# Patient Record
Sex: FEMALE | Race: WHITE | NOT HISPANIC OR LATINO | URBAN - METROPOLITAN AREA
[De-identification: names, ages, dates, MRNs, and addresses within clinical notes are randomized per-mention and may not be internally consistent; named-entity substitution may affect disease eponyms.]

---

## 2019-10-21 ENCOUNTER — OFFICE VISIT - RIVER FALLS (OUTPATIENT)
Dept: FAMILY MEDICINE | Facility: CLINIC | Age: 19
End: 2019-10-21

## 2019-10-21 ENCOUNTER — COMMUNICATION - RIVER FALLS (OUTPATIENT)
Dept: FAMILY MEDICINE | Facility: CLINIC | Age: 19
End: 2019-10-21

## 2019-10-21 LAB
ALBUMIN UR-MCNC: NEGATIVE G/DL
BACTERIA #/AREA URNS HPF: NORMAL /[HPF]
BILIRUB UR QL STRIP: NEGATIVE
EPITHELIAL CELLS UR: NORMAL
GLUCOSE UR STRIP-MCNC: NEGATIVE MG/DL
HGB UR QL STRIP: ABNORMAL
KETONES UR STRIP-MCNC: NEGATIVE MG/DL
LEUKOCYTE ESTERASE UR QL STRIP: ABNORMAL
NITRATE UR QL: POSITIVE
PH UR STRIP: 5.5 [PH] (ref 5–8)
RBC #/AREA URNS AUTO: NORMAL /[HPF]
SP GR UR STRIP: 1.01 (ref 1–1.03)
WBC #/AREA URNS AUTO: NORMAL /[HPF]

## 2019-10-21 ASSESSMENT — MIFFLIN-ST. JEOR: SCORE: 1373.24

## 2020-05-22 ENCOUNTER — OFFICE VISIT - RIVER FALLS (OUTPATIENT)
Dept: FAMILY MEDICINE | Facility: CLINIC | Age: 20
End: 2020-05-22

## 2020-05-22 LAB
ALBUMIN UR-MCNC: NEGATIVE G/DL
BACTERIA #/AREA URNS HPF: NORMAL /[HPF]
BILIRUB UR QL STRIP: NEGATIVE
EPITHELIAL CELLS UR: NORMAL
GLUCOSE UR STRIP-MCNC: NEGATIVE MG/DL
HGB UR QL STRIP: NEGATIVE
KETONES UR STRIP-MCNC: NEGATIVE MG/DL
LEUKOCYTE ESTERASE UR QL STRIP: ABNORMAL
NITRATE UR QL: NEGATIVE
PH UR STRIP: 7 [PH] (ref 5–8)
RBC #/AREA URNS AUTO: NORMAL /[HPF]
SP GR UR STRIP: 1.01 (ref 1–1.03)
WBC #/AREA URNS AUTO: NORMAL /[HPF]
WBC CLUMPS #/AREA URNS HPF: PRESENT /[HPF]

## 2020-05-23 LAB — TRICHOMONAS VAGINALIS (HISTORICAL): NOT DETECTED

## 2020-05-25 LAB — BACTERIA SPEC CULT: ABNORMAL

## 2020-05-26 ENCOUNTER — COMMUNICATION - RIVER FALLS (OUTPATIENT)
Dept: FAMILY MEDICINE | Facility: CLINIC | Age: 20
End: 2020-05-26

## 2020-06-01 ENCOUNTER — COMMUNICATION - RIVER FALLS (OUTPATIENT)
Dept: FAMILY MEDICINE | Facility: CLINIC | Age: 20
End: 2020-06-01

## 2020-08-19 ENCOUNTER — OFFICE VISIT - RIVER FALLS (OUTPATIENT)
Dept: FAMILY MEDICINE | Facility: CLINIC | Age: 20
End: 2020-08-19

## 2020-08-19 LAB
ALBUMIN UR-MCNC: ABNORMAL G/DL
BACTERIA #/AREA URNS HPF: NORMAL /[HPF]
BILIRUB UR QL STRIP: NEGATIVE
EPITHELIAL CELLS UR: NORMAL
GLUCOSE UR STRIP-MCNC: NEGATIVE MG/DL
HGB UR QL STRIP: ABNORMAL
KETONES UR STRIP-MCNC: NEGATIVE MG/DL
LEUKOCYTE ESTERASE UR QL STRIP: ABNORMAL
NITRATE UR QL: NEGATIVE
PH UR STRIP: 7 [PH] (ref 5–8)
RBC #/AREA URNS AUTO: NORMAL /[HPF]
SP GR UR STRIP: 1.01 (ref 1–1.03)
WBC #/AREA URNS AUTO: NORMAL /[HPF]

## 2020-08-21 LAB — BACTERIA SPEC CULT: NORMAL

## 2022-02-12 VITALS
DIASTOLIC BLOOD PRESSURE: 62 MMHG | SYSTOLIC BLOOD PRESSURE: 112 MMHG | HEIGHT: 65 IN | BODY MASS INDEX: 22.49 KG/M2 | WEIGHT: 135 LBS | HEART RATE: 84 BPM | TEMPERATURE: 97 F

## 2022-02-12 VITALS
SYSTOLIC BLOOD PRESSURE: 112 MMHG | BODY MASS INDEX: 22.8 KG/M2 | DIASTOLIC BLOOD PRESSURE: 78 MMHG | TEMPERATURE: 98.5 F | WEIGHT: 137 LBS | HEART RATE: 72 BPM

## 2022-02-16 NOTE — NURSING NOTE
Comprehensive Intake Entered On:  10/21/2019 3:58 PM CDT    Performed On:  10/21/2019 3:51 PM CDT by Alka Ruvalcaba LPN               Summary   Chief Complaint :   dysuria that started this morning. history of UTI's   Last Menstrual Period :   10/16/2019 CDT   Weight Measured :   135 lb(Converted to: 135 lb 0 oz, 61.23 kg)    Height Measured :   65 in(Converted to: 5 ft 5 in, 165.10 cm)    Body Mass Index :   22.46 kg/m2   Body Surface Area :   1.67 m2   Systolic Blood Pressure :   112 mmHg   Diastolic Blood Pressure :   62 mmHg   Mean Arterial Pressure :   79 mmHg   Peripheral Pulse Rate :   84 bpm   BP Site :   Right arm   Pulse Site :   Radial artery   BP Method :   Manual   HR Method :   Manual   Temperature Tympanic :   97.0 DegF(Converted to: 36.1 DegC)  (LOW)    Alka Ruvalcaba LPN - 10/21/2019 3:51 PM CDT   Health Status   Allergies Verified? :   Yes   Medication History Verified? :   Yes   Pre-Visit Planning Status :   N/A   Tobacco Use? :   Never smoker   Alka Ruvalcaba LPN - 10/21/2019 3:51 PM CDT   Consents   Consent for Immunization Exchange :   Consent Granted   Consent for Immunizations to Providers :   Consent Granted   Alka Ruvalcaba LPN - 10/21/2019 3:51 PM CDT   Meds / Allergies   (As Of: 10/21/2019 3:58:31 PM CDT)   Allergies (Active)   Omnicef  Estimated Onset Date:   Unspecified ; Created By:   Alka Ruvalcaba LPN; Reaction Status:   Active ; Category:   Drug ; Substance:   Omnicef ; Type:   Allergy ; Severity:   Mild ; Updated By:   Alka Ruvalcaba LPN; Reviewed Date:   10/21/2019 3:57 PM CDT      penicillin  Estimated Onset Date:   Unspecified ; Created By:   Alka Ruvalcaba LPN; Reaction Status:   Active ; Category:   Drug ; Substance:   penicillin ; Type:   Allergy ; Updated By:   Alka Ruvalcaba LPN; Reviewed Date:   10/21/2019 3:57 PM CDT        Medication List   (As Of: 10/21/2019 3:58:31 PM CDT)   Home Meds    Miscellaneous Prescription  :   Miscellaneous  Prescription ; Status:   Documented ; Ordered As Mnemonic:   Birth Control ; Simple Display Line:   0 Refill(s) ; Catalog Code:   Miscellaneous Prescription ; Order Dt/Tm:   10/21/2019 3:56:41 PM CDT

## 2022-02-16 NOTE — PROGRESS NOTES
Patient:   CHLOE REIS            MRN: [id number]            FIN: [id number]               Age:   20 years     Sex:  Female     :  2000   Associated Diagnoses:   Urinary tract infection, site not specified   Author:   Mamadou Guevara MD      Visit Information      Date of Service: 2020 09:28 am  Performing Location: [facility]  Encounter#: [id number]      Primary Care Provider (PCP):  NONE ,       Referring Provider:  Mamadou Guevara MD    NPI# [id number]      Chief Complaint   2020 9:29 AM CDT    Concerns with dysuria and polyuria x 3 days. No concerns with STD testing.      Interval History   The patient is in today for evaluation of urinary tract infection symptoms.  She has had a couple of urinary tract infections in the past at about six month intervals and wonders what she might do to prevent them in the future.  She has been symptomatic for a day or two presently.  She has not had fever, back pain, or systemic symptoms.  She mainly just has dysuria.        Review of Systems   Review  of systems is negative except as documented under interval history.      Health Status   Allergies:    Allergic Reactions (Selected)  Mild  Omnicef (No reactions were documented)  Severity Not Documented  Penicillin (No reactions were documented)   Medications:  (Selected)   Prescriptions  Prescribed  Bactrim  mg-160 mg oral tablet: 1 tab(s), Oral, bid, x 7 day(s), # 14 tab(s), 0 Refill(s), Type: Acute, Pharmacy: [id number], 1 tab(s) Oral bid,x7 day(s), 65, in, 10/21/19 15:51:00 CDT, Height Measured, 137, lb, 20 9:29:00 CDT, Weight Measured  Documented Medications  Documented  Birth Control: Birth Control, 0 Refill(s), Type: Maintenance   Problem list:    No problem items selected or recorded.      Histories   Past Medical History:    No active or resolved past medical history items have been selected or recorded.   Family History:    Breast cancer  Grandmother  (M)     Procedure history:    No active procedure history items have been selected or recorded.   Social History:        Alcohol Assessment            Current, Beer (12 oz), Wine (5 oz), Liquor (Hard) (1.5 oz), 1-2 times per month, 4 drinks/episode average.               Ready to change: No.      Tobacco Assessment            Never (less than 100 in lifetime)      Substance Abuse Assessment            Never      Employment and Education Assessment            Student, Highest education level: Some college.      Home and Environment Assessment            Marital status: Single.  Living situation: Home/Independent.  Injuries/Abuse/Neglect in household: No.  Feels               unsafe at home: No.  Family/Friends available for support: Yes.  Risks in environment: Stairs.      Nutrition and Health Assessment            Type of diet: Regular.  Wants to lose weight: No.  Sleeping concerns: Yes.  Feels highly stressed: Yes.      Exercise and Physical Activity Assessment            Exercise frequency: 3-4 times/week.  Exercise type: Walking, Weight lifting, Dancing.      Sexual Assessment            Sexually active: Yes.  Identifies as female, Sexual orientation: Bisexual.  History of STD: No.               Contraceptive Use Details: Birth control pill.  History of sexual abuse: No.        Physical Examination   Vital Signs   8/19/2020 9:29 AM CDT Temperature Tympanic 98.5 DegF    Peripheral Pulse Rate 72 bpm    Pulse Site Radial artery    HR Method Manual    Systolic Blood Pressure 112 mmHg    Diastolic Blood Pressure 78 mmHg    Mean Arterial Pressure 89 mmHg    BP Site Right arm    BP Method Manual      General:  UA finds many bacteria with RBCs and WBCs..       Impression and Plan   Diagnosis     Urinary tract infection, site not specified ([id number] N39.0).     Plan:  Culture is pending.  She will be treated for now with Bactrim DS.  We discussed voiding following intercourse and I did propose that we could do test of  cure but since the episodes are fairly infrequent that probably would not really be of value at this point.  .    Patient Instructions:       Counseled: Patient, Regarding diagnosis, Regarding treatment, Regarding medications, Verbalized understanding.

## 2022-02-16 NOTE — TELEPHONE ENCOUNTER
---------------------  From: Kaleb/Maite BORGES ([place] Message [place] ([id number]))   To: [facility] Message Pool ([id number]_[facility]);     Sent: 5/22/2020 9:22:45 AM CDT    Called pt and LM asking her to call back and switch her appointment to a telephone visit with [facility] as she is awaiting a COVID test and was in contact with a positive person.

## 2022-02-16 NOTE — NURSING NOTE
CAGE Assessment Entered On:  10/23/2019 9:23 AM CDT    Performed On:  10/21/2019 9:23 AM CDT by Abigail Mitchell               Assessment   Have you ever felt you should cut down on your drinking :   No   Have people annoyed you by criticizing your drinking :   No   Have you ever felt bad or guilty about your drinking :   No   Have you ever taken a drink first thing in the morning to steady your nerves or get rid of a hangover (Eye-opener) :   No   CAGE Score :   0    Abigail Mitchell - 10/23/2019 9:23 AM CDT

## 2022-02-16 NOTE — NURSING NOTE
Comprehensive Intake Entered On:  8/19/2020 9:33 AM CDT    Performed On:  8/19/2020 9:29 AM CDT by Dalia Bear CMA               Summary   Chief Complaint :   Concerns with dysuria and polyuria x 3 days. No concerns with STD testing.    Last Menstrual Period :   8/16/2020 CDT   Weight Measured :   137 lb(Converted to: 137 lb 0 oz, 62.14 kg)    Systolic Blood Pressure :   112 mmHg   Diastolic Blood Pressure :   78 mmHg   Mean Arterial Pressure :   89 mmHg   Peripheral Pulse Rate :   72 bpm   BP Site :   Right arm   Pulse Site :   Radial artery   BP Method :   Manual   HR Method :   Manual   Temperature Tympanic :   98.5 DegF(Converted to: 36.9 DegC)    Dalia Bear CMA - 8/19/2020 9:29 AM CDT   Health Status   Allergies Verified? :   Yes   Medication History Verified? :   Yes   Pre-Visit Planning Status :   Completed   Tobacco Use? :   Never smoker   Dalia Bear CMA - 8/19/2020 9:29 AM CDT   Meds / Allergies   (As Of: 8/19/2020 9:33:52 AM CDT)   Allergies (Active)   Omnicef  Estimated Onset Date:   Unspecified ; Created By:   Alka Ruvalcaba LPN; Reaction Status:   Active ; Category:   Drug ; Substance:   Omnicef ; Type:   Allergy ; Severity:   Mild ; Updated By:   Alka Ruvalcaba LPN; Reviewed Date:   10/21/2019 3:57 PM CDT      penicillin  Estimated Onset Date:   Unspecified ; Created By:   Alka Ruvalcaba LPN; Reaction Status:   Active ; Category:   Drug ; Substance:   penicillin ; Type:   Allergy ; Updated By:   Alka Ruvalcaba LPN; Reviewed Date:   10/21/2019 3:57 PM CDT        Medication List   (As Of: 8/19/2020 9:33:52 AM CDT)   Home Meds    Miscellaneous Prescription  :   Miscellaneous Prescription ; Status:   Documented ; Ordered As Mnemonic:   Birth Control ; Simple Display Line:   0 Refill(s) ; Catalog Code:   Miscellaneous Prescription ; Order Dt/Tm:   10/21/2019 3:56:41 PM CDT            ID Risk Screen   Recent Travel History :   No recent travel   Family Member Travel History :    No recent travel   Other Exposure to Infectious Disease :   Unknown   Dalia Bear CMA - 8/19/2020 9:29 AM CDT

## 2022-02-16 NOTE — PROGRESS NOTES
"Chief Complaint    dysuria that started this morning. history of UTI's  History of Present Illness      Patient here for pain with urination, at end of urine stream. Does have a history of UTI with in the past 4 months, does not have a long term history.   Does not hold urine for hours. No back pain. No recent fevers. No change in menstrual cycle. No change in sexual activities.  Review of Systems      \"See HPI.  All other review of systems negative.\"  Physical Exam   Vitals & Measurements    T: 97.0   F (Tympanic)  HR: 84(Peripheral)  BP: 112/62     HT: 65 in  WT: 135 lb  BMI: 22.46             General:  Alert and oriented, No acute distress.             Eye: Normal conjunctiva.             HENT:  Oral mucosa is moist.             Neck:  Supple.             Respiratory:  Respirations are non-labored.             Cardiovascular:  Normal rate, Regular rhythm, No edema.             Gastrointestinal:  Soft, non tender, Non-distended.             Genitourinary:  No flank pain             Musculoskeletal:  Normal gait.             Integumentary:  Warm, No rash.             Psychiatric:  Cooperative, Appropriate mood & affect, Normal judgment.      UA positive for infection  Assessment/Plan       1. UTI (urinary tract infection) (N39.0)         Positive UA for infection. Start Macrobid 100mg twice daily for 7 days.        Prevent urinary tract infections by drinking adequate water (6-8 glasses per day), void frequently (do not 'hold' your urine) and if you are sexually active, always urinate before and after intercourse.  You can drink cranberry juice or take a cranberry supplement to prevent bladder infections also.  If you are prescribed a medicine for pain with urination called Pyridium (azo or uristat), know that this medicine can turn your urine orange and also turn your contacts orange if you wear contacts.       Orders:         nitrofurantoin, = 1 cap(s) ( 100 mg ), Oral, bid, x 7 day(s), # 14 cap(s), 0 Refill(s), " Type: Acute, Pharmacy: [facility] [place], 1 cap(s) Oral bid,x7 day(s), (Ordered)         POC URINALYSIS, UA* ([facility]), Specimen Type: Urine, Collection Date: 10/21/19 16:04:00 CDT      I, Alka Ruvalcaba LPN, acted solely as a scribe for, and in the presence of Dr. Lawrence Chase who performed the services.  Patient Information     Name:CHLOE REIS      Address:      [address] [id number]     Sex:Female     YOB: 2000     [phone]     Emergency Contact:URI MACKENZIE     MRN:[id number]     FIN:[id number]     Location:[facility]     Date of Service:10/21/2019      Primary Care Physician:       NONE ,       Attending Physician:       Lawrence Chase MD, [phone]  Problem List/Past Medical History    Ongoing     No qualifying data    Historical     No qualifying data  Medications    Birth Control    nitrofurantoin macrocrystals-monohydrate 100 mg oral capsule, 100 mg= 1 cap(s), Oral, bid  Allergies    Omnicef    penicillin  Social History    Smoking Status - 10/21/2019     Never smoker  Lab Results       Lab Results (Last 4 results within 90 days)        UA pH: 5.5 [5  - 8] (10/21/19 16:09:00)       UA Specific Gravity: 1.015 [1.001  - 1.035] (10/21/19 16:09:00)       UA Glucose: NEGATIVE [NEGATIVE  - NEGATIVE] (10/21/19 16:09:00)       UA Bilirubin: NEGATIVE [NEGATIVE  - NEGATIVE] (10/21/19 16:09:00)       UA Ketones: NEGATIVE [NEGATIVE  - NEGATIVE] (10/21/19 16:09:00)       Urine Occult Blood: 2+ Abnormal [NEGATIVE  - NEGATIVE] (10/21/19 16:09:00)       UA Protein: NEGATIVE [NEGATIVE  - NEGATIVE] (10/21/19 16:09:00)       UA Nitrite: POSITIVE Abnormal [NEGATIVE  - NEGATIVE] (10/21/19 16:09:00)       UA Leukocyte Esterase: 2+ Abnormal [NEGATIVE  - NEGATIVE] (10/21/19 16:09:00)       UA Epithelial Cells: Moderate [None  - Few] (10/21/19 16:20:00)       UA WBC:  [None  - 5] (10/21/19  16:20:00)       UA RBC: 3-5 [None  - 2] (10/21/19 16:20:00)       UA Bacteria: Many [None  - Few] (10/21/19 16:20:00)

## 2022-02-16 NOTE — LETTER
(Inserted Image. Unable to display)   June 01, 2020      CHLOE MACKENZIE      [address]  [address] [place], [place] [id number]        Dear CHLOE,    Thank you for selecting [facility] for your healthcare needs.  Below you will find the results of the recent tests done at our clinic.      Here are your test results, you were also contacted on the phone in my absence. Your recent urine culture results show positive growth of a bacteria that is responsive to the antibiotic which you were prescribed.  Please finish the full course of the antibiotic.  If your symptoms are not then resolved, or if they are worsening,  please let me know. Thank you.      Result Name Current Result Reference Range   Chlam/N. gonorrhea Comments  See comment 5/22/2020    Chlamydia RNA  NOT DETECTED 5/22/2020 NOT DETECTED -    Neisseria gonorrhoeae RNA  NOT DETECTED 5/22/2020 NOT DETECTED -    Trichomonas  NOT DETECTED 5/22/2020 NOT DETECTED -    Culture Urine ((A)) See comment 5/22/2020        Please contact me or my assistant at [phone] if you have any questions or concerns.     Sincerely,        SANFORD Huddleston-NP  Family Nurse Practitioner      What do your labs mean?  Below is a glossary of commonly ordered labs:  LDL   Bad Cholesterol   HDL   Good Cholesterol  AST/ALT   Liver Function   Cr/Creatinine   Kidney Function  Microalbumin   Kidney Function  BUN   Kidney Function  PSA   Prostate    TSH   Thyroid Hormone  HgbA1c   Diabetes Test   Hgb (Hemoglobin)   Red Blood Cells  WBC   White Blood Cell Count

## 2022-02-16 NOTE — NURSING NOTE
Comprehensive Intake Entered On:  5/22/2020 9:37 AM CDT    Performed On:  5/22/2020 9:36 AM CDT by Manju Gomez CMA   Chief Complaint :   c/o dysuria and urinary frequency x4 days. Verbal consent given for telephone visit.    Manju Gomez CMA - 5/22/2020 9:36 AM CDT   Health Status   Allergies Verified? :   Yes   Medication History Verified? :   Yes   Pre-Visit Planning Status :   N/A   Tobacco Use? :   Never smoker   Manju Gomez CMA - 5/22/2020 9:36 AM CDT   Consents   Consent for Immunization Exchange :   Consent Granted   Consent for Immunizations to Providers :   Consent Granted   Manju Gomez CMA - 5/22/2020 9:36 AM CDT   Meds / Allergies   (As Of: 5/22/2020 9:37:04 AM CDT)   Allergies (Active)   Omnicef  Estimated Onset Date:   Unspecified ; Created By:   Alka Ruvalcaba LPN; Reaction Status:   Active ; Category:   Drug ; Substance:   Omnicef ; Type:   Allergy ; Severity:   Mild ; Updated By:   Alka Ruvalcaba LPN; Reviewed Date:   10/21/2019 3:57 PM CDT      penicillin  Estimated Onset Date:   Unspecified ; Created By:   Alka Ruvalcaba LPN; Reaction Status:   Active ; Category:   Drug ; Substance:   penicillin ; Type:   Allergy ; Updated By:   Alka Ruvalcaba LPN; Reviewed Date:   10/21/2019 3:57 PM CDT        Medication List   (As Of: 5/22/2020 9:37:04 AM CDT)   Home Meds    Miscellaneous Prescription  :   Miscellaneous Prescription ; Status:   Documented ; Ordered As Mnemonic:   Birth Control ; Simple Display Line:   0 Refill(s) ; Catalog Code:   Miscellaneous Prescription ; Order Dt/Tm:   10/21/2019 3:56:41 PM CDT            ID Risk Screen   Recent Travel History :   No recent travel   Family Member Travel History :   No recent travel   Other Exposure to Infectious Disease :   Healthcare exposure to COVID-19 within the last 14 days   Manju Gomez CMA - 5/22/2020 9:36 AM CDT

## 2022-02-16 NOTE — LETTER
(Inserted Image. Unable to display)   August 25, 2020      CHLOE MACKENZIE  [address]  [place], [place] [id number]        Dear CHLOE,      Thank you for selecting [facility] for your healthcare needs.     Urine culture is negative. Please let us know if your symptoms are not improving.       Result Name Current Result   Urine Culture  See comment 8/19/2020       Please contact me or my assistant at [phone] if you have any questions or concerns.     Sincerely,

## 2022-02-16 NOTE — PROGRESS NOTES
Patient:   ANIKA REIS            MRN: [id number]            FIN: [id number]               Age:   20 years     Sex:  Female     :  2000   Associated Diagnoses:   Dysuria   Author:   Janis Rios      Visit Information      Date of Service: 2020 09:36 am  Performing Location: [facility]  Encounter#: [id number]      Primary Care Provider (PCP):  NONE ,       Referring Provider:  María Elena Miller MD, NPI# [id number]   Visit type:  Telephone Encounter.    Source of history:  Patient.    Location of patient:  _home  Call Start Time:   _1010  Call End Time:    _1023      Chief Complaint   2020 9:36 AM CDT    c/o dysuria and urinary frequency x4 days. Verbal consent given for telephone visit.   _      History of Present Illness   Today's visit was conducted via telephone due to the COVID-19 pandemic. Patient's consent to telephone visit was obtained and documented.      Reason for visit:  _Anika has a COVID19 test result currently pending. so she will be doing a telephone visit today  Onset of pain and frequency with urination 4 days ago.  No back pain or fever. No vaginal DC or sores.    She has hx of two other UTIs elsewhere in Nov and , felt like both resolved, was treated with Macrobid and DID have urine collected at those visits.  She is sexually active, had a chlamydia test in Dec, has had a new partner since then, last IC about 1 month ago  She would like to do STI testing as well  no hx of kidney problems      Impression and Plan   Diagnosis     Dysuria (KPR08-AU R30.0).     Plan:  will treat as UTi, push fluids and await STI testing.    Orders     Orders (Selected)   Outpatient Orders  Ordered (In Transit)  Chlamydia/Neisseria gonorrhoeae RNA, TMA* ([facility]): Specimen Type: Urine, Collection Date: 20 10:23:00 CDT  Culture, Urine, Routine* ([facility]): Specimen Type: Urine (Clean Catch), Collection Date: 20 10:23:00 CDT  S[place]wab(R) Trichomonas  vaginalis RNA, Ql, TMA* ([facility]): Specimen Type: Swab, Collection Date: 05/22/20 10:22:00 CDT  Prescriptions  Prescribed  Bactrim  mg-160 mg oral tablet: 1 tab(s), Oral, bid, x 3 day(s), # 6 tab(s), 0 Refill(s), Type: Acute, Pharmacy: [facility] #[id number], 1 tab(s) Oral bid,x3 day(s), 65, in, 10/21/19 15:51:00 CDT, Height Measured, 135, lb, 10/21/19 15:51:00 CDT, Weight Measured.        Health Status   Allergies:    Allergic Reactions (Selected)  Mild  Omnicef (No reactions were documented)  Severity Not Documented  Penicillin (No reactions were documented)   Medications:  (Selected)   Documented Medications  Documented  Birth Control: Birth Control, 0 Refill(s), Type: Maintenance,    Medications          *denotes recorded medication          *Birth Control: 0 Refill(s).       Problem list:    No problem items selected or recorded.      Histories   Past Medical History:    No active or resolved past medical history items have been selected or recorded.   Family History:    Breast cancer  Grandmother (M)     Procedure history:    No active procedure history items have been selected or recorded.   Social History:        Alcohol Assessment            Current, Beer (12 oz), Wine (5 oz), Liquor (Hard) (1.5 oz), 1-2 times per month, 4 drinks/episode average.               Ready to change: No.      Tobacco Assessment            Never (less than 100 in lifetime)      Substance Abuse Assessment            Never      Employment and Education Assessment            Student, Highest education level: Some college.      Home and Environment Assessment            Marital status: Single.  Living situation: Home/Independent.  Injuries/Abuse/Neglect in household: No.  Feels               unsafe at home: No.  Family/Friends available for support: Yes.  Risks in environment: Stairs.      Nutrition and Health Assessment            Type of diet: Regular.  Wants to lose weight: No.  Sleeping concerns: Yes.  Feels highly  stressed: Yes.      Exercise and Physical Activity Assessment            Exercise frequency: 3-4 times/week.  Exercise type: Walking, Weight lifting, Dancing.      Sexual Assessment            Sexually active: Yes.  Identifies as female, Sexual orientation: Bisexual.  History of STD: No.               Contraceptive Use Details: Birth control pill.  History of sexual abuse: No.        Review / Management   Results review:  Lab results   5/22/2020 11:46 AM CDT UA WBC Clumps Present    UA Epithelial Cells Few    UA WBC 6-10    UA RBC 0-2    UA Bacteria Few   5/22/2020 11:31 AM CDT UA pH 7.0    UA Specific Gravity 1.010    UA Glucose NEGATIVE    UA Bilirubin NEGATIVE    UA Ketones NEGATIVE    Urine Occult Blood NEGATIVE    UA Protein NEGATIVE    UA Nitrite NEGATIVE    UA Leukocyte Esterase TRACE   .

## 2022-02-16 NOTE — TELEPHONE ENCOUNTER
---------------------  From: Clarita Richter RN   To: Shar Barnhart MD;     Cc: Phone Messages Pool ([id number] - [place]);      Sent: 5/26/2020 2:18:53 PM CDT  Subject: Urine Culture Results     Pt had telephone visit w/[facility] on 5-22-20 for dysuria and frequency x4 days.  No antibiotics prescribed.  Culture results below - please advise.       Micro Number:      [id number]    Test Status:       Final    Specimen Source:   URINE, CLEAN CATCH    Specimen Quality:  Adequate    Result:            Greater than 100,000 CFU/mL of Staphylococcus epidermidis                              S.epidermidis                            ----------------                            INT   [place]     CIPROFLOXACIN          S     <=0.5     GENTAMICIN             S     <=0.5     LEVOFLOXACIN           S     <=0.12     MOXIFLOXACIN           S     <=0.25     NITROFURANTOIN         S     <=16     OXACILLIN              S     <=0.25 **1     TETRACYCLINE           S     2     TRIMETHOPRIM/SULFA     S     <=10     VANCOMYCIN             S     2    S=Susceptible  I=Intermediate  R=Resistant  * = Not Tested  NR = Not Reported  **NN = See Therapy Comments    Results:  Date Result Name Ind Value Ref Range   5/22/2020 11:27 AM Chlam/N. gonorrhea Comments  See comment    5/22/2020 11:27 AM Chlamydia RNA  NOT DETECTED (NOT DETECTED - )   5/22/2020 11:27 AM Neisseria gonorrhoeae RNA  NOT DETECTED (NOT DETECTED - )   5/22/2020 11:27 AM Trichomonas  NOT DETECTED (NOT DETECTED - )   5/22/2020 11:33 AM Culture Urine ((A)) See comment  ** Submitted: **  Order:sulfamethoxazole-trimethoprim (Bactrim  mg-160 mg oral tablet)  1 tab(s)  Oral  bid  Qty:  14 tab(s)        Duration:  7 day(s)        Refills:  0          Substitutions Allowed     Route To Pharmacy - [facility] #[id number]    Signed by Shar Barnhart MD  5/26/2020 7:31:00 PM---------------------  From: Shar Barnhart MD   To: Clarita Richter RN;     Sent: 5/26/2020 2:32:22 PM  CDT  Subject: RE: Urine Culture Results     Please call and let her know antibiotic sent to pharmacy.Called and left message for patient requesting call back.pt returned call at 0914  called back at 0925 and advised, expressed understanding

## 2022-02-16 NOTE — NURSING NOTE
Depression Screening Entered On:  10/23/2019 9:24 AM CDT    Performed On:  10/21/2019 9:23 AM CDT by Abigail Mitchell               Depression Screening   Little Interest - Pleasure in Activities :   Not at all   Feeling Down, Depressed, Hopeless :   Several days   Initial Depression Screen Score :   1    Trouble Falling or Staying Asleep :   Several days   Feeling Tired or Little Energy :   Several days   Poor Appetite or Overeating :   Several days   Feeling Bad About Yourself :   Not at all   Trouble Concentrating :   Several days   Moving or Speaking Slowly :   Not at all   Thoughts Better Off Dead or Hurting Self :   Not at all   Detailed Depression Screen Score :   4    Total Depression Screen Score :   5    TERRIE Difficulty with Work, Home, Others :   Not difficult at all   Abigail Mitchell - 10/23/2019 9:23 AM CDT